# Patient Record
Sex: MALE | URBAN - METROPOLITAN AREA
[De-identification: names, ages, dates, MRNs, and addresses within clinical notes are randomized per-mention and may not be internally consistent; named-entity substitution may affect disease eponyms.]

---

## 2019-09-16 ENCOUNTER — OFFICE VISIT (OUTPATIENT)
Dept: URBAN - METROPOLITAN AREA CLINIC 22 | Facility: CLINIC | Age: 39
End: 2019-09-16
Payer: MEDICARE

## 2019-09-16 DIAGNOSIS — S05.01XA: Primary | ICD-10-CM

## 2019-09-16 PROCEDURE — 92004 COMPRE OPH EXAM NEW PT 1/>: CPT | Performed by: OPTOMETRIST

## 2019-09-16 RX ORDER — LOTEPREDNOL ETABONATE AND TOBRAMYCIN 5; 3 MG/ML; MG/ML
SUSPENSION/ DROPS OPHTHALMIC
Qty: 1 | Refills: 0 | Status: INACTIVE
Start: 2019-09-16 | End: 2020-09-08

## 2019-09-16 ASSESSMENT — INTRAOCULAR PRESSURE
OS: 18
OD: 21

## 2019-09-16 NOTE — IMPRESSION/PLAN
Impression: Corneal abrasion w/o foreign body of rt eye, initial encounter: S05. 01XA. Plan: Discussed diagnosis in detail with patient. Advised patient of condition. Reassured patient of current condition and treatment. Will place BCL OD to heal and follow up. Pt RX'd Tobramycin at urgent care but not filled. Sample of Zylet given today to use TID OD. One gtt given in office today. Pt to sleep with BCL tonight and follow up tomorrow afternoon.

## 2019-09-17 ENCOUNTER — OFFICE VISIT (OUTPATIENT)
Dept: URBAN - METROPOLITAN AREA CLINIC 22 | Facility: CLINIC | Age: 39
End: 2019-09-17
Payer: MEDICARE

## 2019-09-17 PROCEDURE — 99213 OFFICE O/P EST LOW 20 MIN: CPT | Performed by: OPTOMETRIST

## 2019-09-17 NOTE — IMPRESSION/PLAN
Impression: Corneal abrasion w/o foreign body of rt eye, initial encounter: S05. 01XA. Plan: Discussed diagnosis in detail with patient. Exam OD shows mild corneal abrasion, no FB seen today. Replaced [place] CL in OD. Recommend to continue with medication and follow - up tomorrow to assess the cornea and check for FB, make sure cornea is heal before replacing BCL. Use Zylet TID OD.

## 2019-09-18 ENCOUNTER — OFFICE VISIT (OUTPATIENT)
Dept: URBAN - METROPOLITAN AREA CLINIC 22 | Facility: CLINIC | Age: 39
End: 2019-09-18
Payer: MEDICARE

## 2019-09-18 PROCEDURE — 99213 OFFICE O/P EST LOW 20 MIN: CPT | Performed by: OPTOMETRIST

## 2019-09-18 NOTE — IMPRESSION/PLAN
Impression: Corneal abrasion w/o foreign body of rt eye, initial encounter: S05. 01XA. OD. Plan: OD looking good today 90% healed up continue Zylet TID for 2 more days.

## 2020-09-08 ENCOUNTER — OFFICE VISIT (OUTPATIENT)
Dept: URBAN - METROPOLITAN AREA CLINIC 25 | Facility: CLINIC | Age: 40
End: 2020-09-08
Payer: MEDICARE

## 2020-09-08 DIAGNOSIS — H20.011 PRIMARY IRIDOCYCLITIS, RIGHT EYE: Primary | ICD-10-CM

## 2020-09-08 PROCEDURE — 92014 COMPRE OPH EXAM EST PT 1/>: CPT | Performed by: OPTOMETRIST

## 2020-09-08 RX ORDER — PREDNISOLONE ACETATE 10 MG/ML
1 % SUSPENSION/ DROPS OPHTHALMIC
Qty: 1 | Refills: 0 | Status: INACTIVE
Start: 2020-09-08 | End: 2020-09-10

## 2020-09-08 ASSESSMENT — INTRAOCULAR PRESSURE
OS: 20
OD: 19

## 2020-09-08 NOTE — IMPRESSION/PLAN
Impression: Primary iridocyclitis, right eye: H20.011. Plan: pt edu. pred forte q2h then taper. rtc thur or fri of this week for adjustment to tx.

## 2020-09-10 ENCOUNTER — OFFICE VISIT (OUTPATIENT)
Dept: URBAN - METROPOLITAN AREA CLINIC 25 | Facility: CLINIC | Age: 40
End: 2020-09-10
Payer: MEDICARE

## 2020-09-10 PROCEDURE — 92012 INTRM OPH EXAM EST PATIENT: CPT | Performed by: OPTOMETRIST

## 2020-09-10 RX ORDER — PREDNISOLONE ACETATE 10 MG/ML
1 % SUSPENSION/ DROPS OPHTHALMIC
Qty: 1 | Refills: 0 | Status: ACTIVE
Start: 2020-09-10

## 2020-09-10 ASSESSMENT — INTRAOCULAR PRESSURE
OD: 16
OS: 18

## 2020-09-10 NOTE — IMPRESSION/PLAN
Impression: Primary iridocyclitis, right eye: H20.011. Plan: pt edu. continue pred forte for q1-2h over the weekend then start taper. QID then BID the QD for 1 week each.

## 2020-09-17 ENCOUNTER — OFFICE VISIT (OUTPATIENT)
Dept: URBAN - METROPOLITAN AREA CLINIC 25 | Facility: CLINIC | Age: 40
End: 2020-09-17
Payer: MEDICARE

## 2020-09-17 DIAGNOSIS — H16.223 KERATOCONJUNCTIVITIS SICCA, NOT SPECIFIED AS SJÖGREN'S, BILATERAL: ICD-10-CM

## 2020-09-17 PROCEDURE — 92012 INTRM OPH EXAM EST PATIENT: CPT | Performed by: OPTOMETRIST

## 2020-09-17 RX ORDER — LIFITEGRAST 50 MG/ML
5 % SOLUTION/ DROPS OPHTHALMIC
Qty: 180 | Refills: 3 | Status: ACTIVE
Start: 2020-09-17

## 2020-09-17 ASSESSMENT — INTRAOCULAR PRESSURE
OS: 15
OD: 15